# Patient Record
Sex: FEMALE | Race: WHITE
[De-identification: names, ages, dates, MRNs, and addresses within clinical notes are randomized per-mention and may not be internally consistent; named-entity substitution may affect disease eponyms.]

---

## 2019-03-07 ENCOUNTER — HOSPITAL ENCOUNTER (EMERGENCY)
Dept: HOSPITAL 46 - ED | Age: 39
Discharge: ANOTHER HOSPITAL | End: 2019-03-07
Payer: COMMERCIAL

## 2019-03-07 VITALS — WEIGHT: 201.28 LBS | BODY MASS INDEX: 37.04 KG/M2 | HEIGHT: 62 IN

## 2019-03-07 DIAGNOSIS — Z79.899: ICD-10-CM

## 2019-03-07 DIAGNOSIS — K12.2: Primary | ICD-10-CM

## 2019-03-07 DIAGNOSIS — I10: ICD-10-CM

## 2025-03-11 ENCOUNTER — HOSPITAL ENCOUNTER (OUTPATIENT)
Dept: HOSPITAL 46 - DS | Age: 45
Discharge: HOME | End: 2025-03-11
Attending: SURGERY
Payer: COMMERCIAL

## 2025-03-11 VITALS — SYSTOLIC BLOOD PRESSURE: 118 MMHG | DIASTOLIC BLOOD PRESSURE: 82 MMHG

## 2025-03-11 VITALS — SYSTOLIC BLOOD PRESSURE: 119 MMHG | DIASTOLIC BLOOD PRESSURE: 92 MMHG

## 2025-03-11 VITALS — WEIGHT: 206 LBS | BODY MASS INDEX: 37.91 KG/M2 | HEIGHT: 62 IN

## 2025-03-11 DIAGNOSIS — D12.3: ICD-10-CM

## 2025-03-11 DIAGNOSIS — I10: ICD-10-CM

## 2025-03-11 DIAGNOSIS — Z87.891: ICD-10-CM

## 2025-03-11 DIAGNOSIS — E66.9: ICD-10-CM

## 2025-03-11 DIAGNOSIS — Z12.11: Primary | ICD-10-CM

## 2025-03-11 DIAGNOSIS — E78.5: ICD-10-CM

## 2025-03-11 DIAGNOSIS — Z79.899: ICD-10-CM

## 2025-03-11 DIAGNOSIS — F81.9: ICD-10-CM

## 2025-03-11 DIAGNOSIS — K62.1: ICD-10-CM

## 2025-03-11 PROCEDURE — 0DBL8ZX EXCISION OF TRANSVERSE COLON, VIA NATURAL OR ARTIFICIAL OPENING ENDOSCOPIC, DIAGNOSTIC: ICD-10-PCS | Performed by: SURGERY

## 2025-03-11 PROCEDURE — G0500 MOD SEDAT ENDO SERVICE >5YRS: HCPCS

## 2025-03-11 PROCEDURE — 0DBP8ZX EXCISION OF RECTUM, VIA NATURAL OR ARTIFICIAL OPENING ENDOSCOPIC, DIAGNOSTIC: ICD-10-PCS | Performed by: SURGERY

## 2025-03-11 NOTE — NUR
03/11/25 Johnny7 Lizzeth Burton
1113-PATIENT ARRIVED TO PACU AWAKE LAYING LEFT LATERAL 2L NC 98% RR
EVEN. . ABDOMEN SOFT IVF INFUSING. ENCOURAGED TO PASS GAS.
DENIES PAIN OR NAUSEA.

## 2025-03-12 NOTE — OR
[facility]
                                    [address], [place]  [address]
_________________________________________________________________________________________
                                                                 Signed   
 
 
DATE OF OPERATION:
03/11/2025
 
SURGEON:
Tara Thomas MD
 
PREOPERATIVE DIAGNOSIS:
Positive Cologuard test.
 
POSTOPERATIVE DIAGNOSES:
1. 4 mm polyp at 8 cm in rectum.
2. 4 mm polyp at proximal transverse colon.
3. 4 mm polyp at hepatic flexure.
 
PROCEDURE:
Colonoscopy with hot biopsy.
 
ESTIMATED BLOOD LOSS:
None.
 
INDICATIONS:
Teetee is a 44-year-old female who is learning disabled and is now on disability.
She has a sister who helps her get around town and go to appointments and so forth.  She
had a positive Cologuard test in February 2024.  She has been anxious about having the
colonoscopy.  She told me she was raised by her grandparents.  She has no knowledge of
her dad or his side of the family.  She does not believe anyone on her mother side of
the family has colon cancer or colon polyps.  She has no lower GI complaints.  She told
me the heart rate is fast because she was anxious in the office.  In the office, I gave
her our brochure on colonoscopy.  We had reviewed the nature of the colonoscopy.  She
understands there is risk including, but not limited to gas bloating, crampy abdominal
pain, bleeding, perforation requiring surgery, and missed diagnosis.  We also reviewed
the written instructions for the bowel prep line by line.  She also understands the need
for IV conscious sedation.  She had expressed understanding and wished to proceed. 
 
DESCRIPTION OF PROCEDURE:
Teetee was taken into our endoscopy suite and placed in the left lateral decubitus
position.  Once again she was anxious and her heart rate was just over 100 as it was in
the office.  She was given a total of 12 mg of Versed and 150 mcg of fentanyl.  Overall,
she did fine, but she was frequently awake and moaning.  I think in the future she would
be much better served with monitored anesthesia care propofol infusion.  I think it will
be safer and she will wake up  and it would be more comfortable.  A digital
rectal exam had been done and this was unremarkable.  No external hemorrhoids.  Good
 
    Electronically Signed By: TARA THOMAS MD  03/12/25 1030
_________________________________________________________________________________________
PATIENT NAME:     TEETEE DWYER                 
MEDICAL RECORD #: [id number]            OPERATIVE REPORT              
          ACCT #: [id number]  
DATE OF BIRTH:   [birth year]            REPORT #: [id number]      
PHYSICIAN:        TARA THOMAS MD             
PCP:              GEORGIA ARELLANO PA-C              
REPORT IS CONFIDENTIAL AND NOT TO BE RELEASED WITHOUT AUTHORIZATION
 
 
                                  [facility]
                                    [address], [place]  [address]
_________________________________________________________________________________________
                                                                 Signed   
 
 
sphincter tone.  No masses.  The adult colonoscope had been introduced and advanced as
above.  We eventually made it around into the cecum.  She had some areas of liquid stool
that was irrigated and suctioned out.  I think in the future, she could increase the
polyethylene glycol up to 3/4 or even a full gallon along with the Dulcolax tablets
would be better.  We got into the cecum, I could see the appendiceal orifice.  We could
see the ileocecal valve.  We had taken pictures throughout for photodocumentation.  The
scope was then slowly withdrawn.  We removed the above polyps with the help of hot
biopsy forceps.  No diverticulosis.  Once in the rectum, the scope was retroflexed and
we did not see any obvious pathology above the anal canal.  After this, the gas was
suctioned out.  The colonoscope removed.  Teetee tolerated the procedure quite well. 
 
RECOMMENDATIONS:
Teetee will follow up my office in 1 to 2 weeks to review her results.  She should
probably have monitored anesthesia care in the future as described above. 
 
 
 
            ________________________________________
            Tara Thomas MD 
 
 
ALB/MODL
Job #:  [id number]
DD:  03/11/2025 11:22:25
DT:  03/11/2025 13:46:09
 
cc:            PERCY Lozano MD
 
 
Copies:  TARA THOMAS MD
~
 
 
 
 
 
 
 
 
 
 
    Electronically Signed By: TARA THOMAS MD  03/12/25 1030
_________________________________________________________________________________________
PATIENT NAME:     TEETEE DWYER                 
MEDICAL RECORD #: [id number]            OPERATIVE REPORT              
          ACCT #: [id number]  
DATE OF BIRTH:   [birth year]            REPORT #: [id number]      
PHYSICIAN:        TARA THOMAS MD             
PCP:              GEORGIA ARELLANO PA-C              
REPORT IS CONFIDENTIAL AND NOT TO BE RELEASED WITHOUT AUTHORIZATION

## 2025-03-13 NOTE — PATH
[facility]
                                    [address], [place]  [address]
_________________________________________________________________________________________
                                                                 Signed   
 
 
 
SPECIMEN(S): A RECTAL POLYP AT 8 CM
SPECIMEN(S): B PROXIMAL TRANSVERSE COLON POLYP
SPECIMEN(S): C HEPATIC FLEXURE COLON POLYP
 
SPECIMEN SOURCE:
A. RECTAL POLYP AT 8 CM
B. PROXIMAL TRANSVERSE COLON POLYP
C. HEPATIC FLEXURE COLON POLYP
 
CLINICAL HISTORY:
Screening, positive Cologuard.
 
FINAL PATHOLOGIC DIAGNOSIS:
A. Rectal polyp at 8 cm:
- Hyperplastic polyp (one fragment).
B. Proximal transverse colon polyp:
- Serrated polyp/adenoma (one fragment).
C. Hepatic flexure colon polyp:
- Tubular adenoma (one fragment).
JVR:smn
 
MICROSCOPIC EXAMINATION:
Histologic sections of all submitted blocks are examined by light microscopy.  
These findings, together with the gross examination, support the pathologic 
diagnosis. 
 
GROSS DESCRIPTION:
A. The specimen, labeled and designated "Radha, C, rectal polyp at 8 cm," is 
received in formalin and consists of one tan soft tissue fragment, 0.3 cm. 
Entirely submitted in (A1). 
B. The specimen, labeled and designated "Radha, C, proximal transverse colon 
polyp," is received in formalin and consists of one tan soft tissue fragment, 
0.3 cm. Entirely submitted in (B1). 
C. The specimen, labeled and designated "Radha, C, hepatic flexure colon 
polyp," is received in formalin and consists of one tan soft tissue fragment, 
0.3 cm. Entirely submitted in (C1). 
AB  (under the direct supervision of a pathologist)
The Gross Description was prepared using a voice recognition system. The report 
was reviewed for accuracy; however, sound-alike word errors, addition and/or 
deletions may occur. If there is any 
question about this report, please contact Client Services.
 
                                                                                    
_________________________________________________________________________________________
PATIENT NAME:     NGA DWYER                 
MEDICAL RECORD #: [id number]            PATHOLOGY                     
          ACCT #: [id number]       ACCESSION #: [id number]     
DATE OF BIRTH:   [birth year]            REPORT #: [id number]       
PHYSICIAN:        ATUL SOLIS              
PCP:              GEORGIA ARELLANO PA-C              
REPORT IS CONFIDENTIAL AND NOT TO BE RELEASED WITHOUT AUTHORIZATION
 
 
                                  [address]  [address]
_________________________________________________________________________________________
                                                                 Signed   
 
 
 
PERFORMING LABORATORY:
Technical component was performed by "[facility] [employer] [address] [place] [id number] (CLIA# [id number]). Professional interpretation was 
performed by [facility], [address] [place] [id number] (CLIA#: [id number]).
 
Diagnostician:  Warren Orr MD
Pathologist
Electronically Signed 03/13/2025
 
 
Copies:                                
~
 
 
 
 
 
 
 
 
 
 
 
 
 
 
 
 
 
 
 
 
 
 
 
 
 
 
 
 
 
                                                                                    
_________________________________________________________________________________________
PATIENT NAME:     NGA DWYER                 
MEDICAL RECORD #: [id number]            PATHOLOGY                     
          ACCT #: [id number]       ACCESSION #: [id number]     
DATE OF BIRTH:   [birth year]            REPORT #: [id number]       
PHYSICIAN:        ATUL SOLIS              
PCP:              GEORGIA ARELLANO PA-C              
REPORT IS CONFIDENTIAL AND NOT TO BE RELEASED WITHOUT AUTHORIZATION